# Patient Record
Sex: MALE | Race: WHITE | NOT HISPANIC OR LATINO | Employment: OTHER | URBAN - METROPOLITAN AREA
[De-identification: names, ages, dates, MRNs, and addresses within clinical notes are randomized per-mention and may not be internally consistent; named-entity substitution may affect disease eponyms.]

---

## 2017-01-01 ENCOUNTER — OUTPATIENT INFUSION SERVICES (OUTPATIENT)
Dept: ONCOLOGY | Facility: MEDICAL CENTER | Age: 62
End: 2017-01-01
Attending: INTERNAL MEDICINE
Payer: MEDICARE

## 2017-01-01 ENCOUNTER — HOSPITAL ENCOUNTER (OUTPATIENT)
Dept: LAB | Facility: MEDICAL CENTER | Age: 62
End: 2017-01-26
Attending: INTERNAL MEDICINE
Payer: MEDICARE

## 2017-01-01 ENCOUNTER — TELEPHONE (OUTPATIENT)
Dept: MEDICAL GROUP | Facility: PHYSICIAN GROUP | Age: 62
End: 2017-01-01

## 2017-01-01 ENCOUNTER — OFFICE VISIT (OUTPATIENT)
Dept: MEDICAL GROUP | Facility: PHYSICIAN GROUP | Age: 62
End: 2017-01-01
Payer: MEDICARE

## 2017-01-01 ENCOUNTER — APPOINTMENT (OUTPATIENT)
Dept: ONCOLOGY | Facility: MEDICAL CENTER | Age: 62
End: 2017-01-01
Attending: RADIOLOGY
Payer: MEDICARE

## 2017-01-01 VITALS
HEIGHT: 69 IN | WEIGHT: 135.8 LBS | SYSTOLIC BLOOD PRESSURE: 112 MMHG | DIASTOLIC BLOOD PRESSURE: 72 MMHG | OXYGEN SATURATION: 95 % | TEMPERATURE: 98.1 F | RESPIRATION RATE: 16 BRPM | BODY MASS INDEX: 20.11 KG/M2 | HEART RATE: 60 BPM

## 2017-01-01 VITALS
TEMPERATURE: 98.6 F | DIASTOLIC BLOOD PRESSURE: 78 MMHG | BODY MASS INDEX: 19.71 KG/M2 | HEIGHT: 68 IN | OXYGEN SATURATION: 95 % | SYSTOLIC BLOOD PRESSURE: 104 MMHG | RESPIRATION RATE: 18 BRPM | HEART RATE: 109 BPM | WEIGHT: 130.07 LBS

## 2017-01-01 VITALS
WEIGHT: 128.53 LBS | RESPIRATION RATE: 18 BRPM | SYSTOLIC BLOOD PRESSURE: 118 MMHG | HEIGHT: 68 IN | HEART RATE: 77 BPM | TEMPERATURE: 97.1 F | OXYGEN SATURATION: 96 % | BODY MASS INDEX: 19.48 KG/M2 | DIASTOLIC BLOOD PRESSURE: 83 MMHG

## 2017-01-01 VITALS
HEART RATE: 121 BPM | WEIGHT: 128.53 LBS | HEIGHT: 68 IN | SYSTOLIC BLOOD PRESSURE: 111 MMHG | OXYGEN SATURATION: 95 % | TEMPERATURE: 98.1 F | BODY MASS INDEX: 19.48 KG/M2 | RESPIRATION RATE: 18 BRPM | DIASTOLIC BLOOD PRESSURE: 85 MMHG

## 2017-01-01 VITALS
WEIGHT: 130.07 LBS | RESPIRATION RATE: 18 BRPM | TEMPERATURE: 98.5 F | HEIGHT: 69 IN | SYSTOLIC BLOOD PRESSURE: 103 MMHG | DIASTOLIC BLOOD PRESSURE: 82 MMHG | BODY MASS INDEX: 19.27 KG/M2 | HEART RATE: 106 BPM | OXYGEN SATURATION: 98 %

## 2017-01-01 DIAGNOSIS — B37.0 ORAL THRUSH: ICD-10-CM

## 2017-01-01 DIAGNOSIS — C34.92 METASTATIC LUNG CANCER (METASTASIS FROM LUNG TO OTHER SITE), LEFT (HCC): ICD-10-CM

## 2017-01-01 DIAGNOSIS — C34.90 NON-SMALL CELL LUNG CANCER (NSCLC) (HCC): ICD-10-CM

## 2017-01-01 DIAGNOSIS — T14.8XXA MULTIPLE SKIN TEARS: ICD-10-CM

## 2017-01-01 DIAGNOSIS — F43.21 ADJUSTMENT DISORDER WITH DEPRESSED MOOD: ICD-10-CM

## 2017-01-01 DIAGNOSIS — C79.49 SECONDARY MALIGNANT NEOPLASM OF BRAIN AND SPINAL CORD (HCC): ICD-10-CM

## 2017-01-01 DIAGNOSIS — C79.31 SECONDARY MALIGNANT NEOPLASM OF BRAIN AND SPINAL CORD (HCC): ICD-10-CM

## 2017-01-01 LAB
ALBUMIN SERPL BCP-MCNC: 3.5 G/DL (ref 3.2–4.9)
ALBUMIN SERPL BCP-MCNC: 3.6 G/DL (ref 3.2–4.9)
ALBUMIN SERPL BCP-MCNC: 3.8 G/DL (ref 3.2–4.9)
ALBUMIN/GLOB SERPL: 1.3 G/DL
ALBUMIN/GLOB SERPL: 1.5 G/DL
ALBUMIN/GLOB SERPL: 1.8 G/DL
ALP SERPL-CCNC: 76 U/L (ref 30–99)
ALP SERPL-CCNC: 77 U/L (ref 30–99)
ALP SERPL-CCNC: 99 U/L (ref 30–99)
ALT SERPL-CCNC: 25 U/L (ref 2–50)
ALT SERPL-CCNC: 25 U/L (ref 2–50)
ALT SERPL-CCNC: 39 U/L (ref 2–50)
ANION GAP SERPL CALC-SCNC: 10 MMOL/L (ref 0–11.9)
ANION GAP SERPL CALC-SCNC: 12 MMOL/L (ref 0–11.9)
ANION GAP SERPL CALC-SCNC: 9 MMOL/L (ref 0–11.9)
ANISOCYTOSIS BLD QL SMEAR: ABNORMAL
ANISOCYTOSIS BLD QL SMEAR: ABNORMAL
AST SERPL-CCNC: 19 U/L (ref 12–45)
AST SERPL-CCNC: 20 U/L (ref 12–45)
AST SERPL-CCNC: 23 U/L (ref 12–45)
BASOPHILS # BLD AUTO: 0 % (ref 0–1.8)
BASOPHILS # BLD AUTO: 0 K/UL (ref 0–0.12)
BASOPHILS # BLD AUTO: 0.3 % (ref 0–1.8)
BASOPHILS # BLD: 0 K/UL (ref 0–0.12)
BASOPHILS # BLD: 0.03 K/UL (ref 0–0.12)
BASOPHILS NFR BLD AUTO: 0 % (ref 0–1.8)
BILIRUB SERPL-MCNC: 0.4 MG/DL (ref 0.1–1.5)
BILIRUB SERPL-MCNC: 0.5 MG/DL (ref 0.1–1.5)
BILIRUB SERPL-MCNC: 0.7 MG/DL (ref 0.1–1.5)
BUN SERPL-MCNC: 18 MG/DL (ref 8–22)
BUN SERPL-MCNC: 20 MG/DL (ref 8–22)
BUN SERPL-MCNC: 35 MG/DL (ref 8–22)
CALCIUM SERPL-MCNC: 8.7 MG/DL (ref 8.5–10.5)
CALCIUM SERPL-MCNC: 8.7 MG/DL (ref 8.5–10.5)
CALCIUM SERPL-MCNC: 9 MG/DL (ref 8.5–10.5)
CHLORIDE SERPL-SCNC: 103 MMOL/L (ref 96–112)
CHLORIDE SERPL-SCNC: 103 MMOL/L (ref 96–112)
CHLORIDE SERPL-SCNC: 99 MMOL/L (ref 96–112)
CO2 SERPL-SCNC: 22 MMOL/L (ref 20–33)
CO2 SERPL-SCNC: 25 MMOL/L (ref 20–33)
CO2 SERPL-SCNC: 26 MMOL/L (ref 20–33)
CREAT SERPL-MCNC: 0.91 MG/DL (ref 0.5–1.4)
CREAT SERPL-MCNC: 0.94 MG/DL (ref 0.5–1.4)
CREAT SERPL-MCNC: 1.3 MG/DL (ref 0.5–1.4)
EOSINOPHIL # BLD AUTO: 0.01 K/UL (ref 0–0.51)
EOSINOPHIL # BLD AUTO: 0.04 K/UL (ref 0–0.51)
EOSINOPHIL # BLD: 0.02 K/UL (ref 0–0.51)
EOSINOPHIL NFR BLD AUTO: 0.9 % (ref 0–6.9)
EOSINOPHIL NFR BLD: 0.1 % (ref 0–6.9)
EOSINOPHIL NFR BLD: 0.9 % (ref 0–6.9)
ERYTHROCYTE [DISTWIDTH] IN BLOOD BY AUTOMATED COUNT: 55.5 FL (ref 35.9–50)
ERYTHROCYTE [DISTWIDTH] IN BLOOD BY AUTOMATED COUNT: 60.5 FL (ref 35.9–50)
ERYTHROCYTE [DISTWIDTH] IN BLOOD BY AUTOMATED COUNT: 63.6 FL (ref 35.9–50)
GFR SERPL CREATININE-BSD FRML MDRD: >60 ML/MIN/1.73 M 2
GFR SERPL CREATININE-BSD FRML MDRD: >60 ML/MIN/1.73 M 2
GLOBULIN SER CALC-MCNC: 1.9 G/DL (ref 1.9–3.5)
GLOBULIN SER CALC-MCNC: 2.4 G/DL (ref 1.9–3.5)
GLOBULIN SER CALC-MCNC: 2.9 G/DL (ref 1.9–3.5)
GLUCOSE SERPL-MCNC: 144 MG/DL (ref 65–99)
GLUCOSE SERPL-MCNC: 146 MG/DL (ref 65–99)
GLUCOSE SERPL-MCNC: 151 MG/DL (ref 65–99)
HCT VFR BLD AUTO: 34.4 % (ref 42–52)
HCT VFR BLD AUTO: 34.8 % (ref 42–52)
HCT VFR BLD AUTO: 35.6 % (ref 42–52)
HGB BLD-MCNC: 11.3 G/DL (ref 14–18)
HGB BLD-MCNC: 11.5 G/DL (ref 14–18)
HGB BLD-MCNC: 11.7 G/DL (ref 14–18)
LDH SERPL L TO P-CCNC: 336 U/L (ref 107–266)
LDH SERPL-CCNC: 232 U/L (ref 107–266)
LYMPHOCYTES # BLD AUTO: 0.74 K/UL (ref 1–4.8)
LYMPHOCYTES # BLD AUTO: 1.07 K/UL (ref 1–4.8)
LYMPHOCYTES # BLD: 0.75 K/UL (ref 1–4.8)
LYMPHOCYTES NFR BLD AUTO: 31.2 % (ref 22–41)
LYMPHOCYTES NFR BLD: 12 % (ref 22–41)
LYMPHOCYTES NFR BLD: 17.1 % (ref 22–41)
MAGNESIUM SERPL-MCNC: 1.5 MG/DL (ref 1.5–2.5)
MAGNESIUM SERPL-MCNC: 1.8 MG/DL (ref 1.5–2.5)
MAGNESIUM SERPL-MCNC: 1.8 MG/DL (ref 1.5–2.5)
MANUAL DIFF BLD: NORMAL
MANUAL DIFF BLD: NORMAL
MCH RBC QN AUTO: 31.3 PG (ref 27–33)
MCH RBC QN AUTO: 31.7 PG (ref 27–33)
MCH RBC QN AUTO: 31.7 PG (ref 27–33)
MCHC RBC AUTO-ENTMCNC: 32.8 G/DL (ref 33.7–35.3)
MCHC RBC AUTO-ENTMCNC: 32.9 G/DL (ref 33.7–35.3)
MCHC RBC AUTO-ENTMCNC: 33 G/DL (ref 33.7–35.3)
MCV RBC AUTO: 94.8 FL (ref 81.4–97.8)
MCV RBC AUTO: 96.5 FL (ref 81.4–97.8)
MCV RBC AUTO: 96.6 FL (ref 81.4–97.8)
MICROCYTES BLD QL SMEAR: ABNORMAL
MONOCYTES # BLD AUTO: 0.39 K/UL (ref 0–0.85)
MONOCYTES # BLD AUTO: 0.54 K/UL (ref 0–0.85)
MONOCYTES # BLD: 0.24 K/UL (ref 0–0.85)
MONOCYTES NFR BLD AUTO: 6.1 % (ref 0–13.4)
MONOCYTES NFR BLD AUTO: 9 % (ref 0–13.4)
MONOCYTES NFR BLD AUTO: 9.8 % (ref 0–13.4)
MORPHOLOGY BLD-IMP: NORMAL
MORPHOLOGY BLD-IMP: NORMAL
NEUTROPHILS # BLD AUTO: 3.14 K/UL (ref 1.82–7.42)
NEUTROPHILS # BLD AUTO: 7.27 K/UL (ref 1.82–7.42)
NEUTROPHILS # BLD: 1.33 K/UL (ref 1.82–7.42)
NEUTROPHILS NFR BLD AUTO: 55.4 % (ref 44–72)
NEUTROPHILS NFR BLD: 70.3 % (ref 44–72)
NEUTROPHILS NFR BLD: 81.5 % (ref 44–72)
NEUTS BAND NFR BLD MANUAL: 2.7 % (ref 0–10)
NEUTS HYPERSEG BLD QL SMEAR: NORMAL
NRBC # BLD AUTO: 0 K/UL
NRBC # BLD AUTO: 0.04 K/UL
NRBC BLD AUTO-RTO: 0 /100 WBC
NRBC BLD-RTO: 1.7 /100 WBC
OVALOCYTES BLD QL SMEAR: NORMAL
PLATELET # BLD AUTO: 125 K/UL (ref 164–446)
PLATELET # BLD AUTO: 71 K/UL (ref 164–446)
PLATELET # BLD AUTO: 75 K/UL (ref 164–446)
PLATELET BLD QL SMEAR: NORMAL
PLATELET BLD QL SMEAR: NORMAL
PMV BLD AUTO: 10.6 FL (ref 9–12.9)
PMV BLD AUTO: 12.1 FL (ref 9–12.9)
PMV BLD AUTO: 9 FL (ref 9–12.9)
POIKILOCYTOSIS BLD QL SMEAR: NORMAL
POTASSIUM SERPL-SCNC: 3.7 MMOL/L (ref 3.6–5.5)
POTASSIUM SERPL-SCNC: 3.7 MMOL/L (ref 3.6–5.5)
POTASSIUM SERPL-SCNC: 4.8 MMOL/L (ref 3.6–5.5)
PROMYELOCYTES NFR BLD MANUAL: 2.7 %
PROT SERPL-MCNC: 5.4 G/DL (ref 6–8.2)
PROT SERPL-MCNC: 6 G/DL (ref 6–8.2)
PROT SERPL-MCNC: 6.7 G/DL (ref 6–8.2)
RBC # BLD AUTO: 3.56 M/UL (ref 4.7–6.1)
RBC # BLD AUTO: 3.67 M/UL (ref 4.7–6.1)
RBC # BLD AUTO: 3.69 M/UL (ref 4.7–6.1)
RBC BLD AUTO: PRESENT
RBC BLD AUTO: PRESENT
SODIUM SERPL-SCNC: 133 MMOL/L (ref 135–145)
SODIUM SERPL-SCNC: 137 MMOL/L (ref 135–145)
SODIUM SERPL-SCNC: 139 MMOL/L (ref 135–145)
TOXIC GRANULATION 1637: NORMAL
URATE SERPL-MCNC: 3.2 MG/DL (ref 2.5–8.3)
URATE SERPL-MCNC: 4.9 MG/DL (ref 2.5–8.3)
WBC # BLD AUTO: 2.4 K/UL (ref 4.8–10.8)
WBC # BLD AUTO: 4.3 K/UL (ref 4.8–10.8)
WBC # BLD AUTO: 8.9 K/UL (ref 4.8–10.8)

## 2017-01-01 PROCEDURE — 36591 DRAW BLOOD OFF VENOUS DEVICE: CPT

## 2017-01-01 PROCEDURE — 85027 COMPLETE CBC AUTOMATED: CPT

## 2017-01-01 PROCEDURE — 700105 HCHG RX REV CODE 258

## 2017-01-01 PROCEDURE — 700111 HCHG RX REV CODE 636 W/ 250 OVERRIDE (IP)

## 2017-01-01 PROCEDURE — 36415 COLL VENOUS BLD VENIPUNCTURE: CPT

## 2017-01-01 PROCEDURE — 80053 COMPREHEN METABOLIC PANEL: CPT

## 2017-01-01 PROCEDURE — 96372 THER/PROPH/DIAG INJ SC/IM: CPT

## 2017-01-01 PROCEDURE — A4212 NON CORING NEEDLE OR STYLET: HCPCS

## 2017-01-01 PROCEDURE — 83615 LACTATE (LD) (LDH) ENZYME: CPT

## 2017-01-01 PROCEDURE — 96367 TX/PROPH/DG ADDL SEQ IV INF: CPT

## 2017-01-01 PROCEDURE — 700111 HCHG RX REV CODE 636 W/ 250 OVERRIDE (IP): Performed by: INTERNAL MEDICINE

## 2017-01-01 PROCEDURE — 99214 OFFICE O/P EST MOD 30 MIN: CPT | Performed by: FAMILY MEDICINE

## 2017-01-01 PROCEDURE — 83735 ASSAY OF MAGNESIUM: CPT

## 2017-01-01 PROCEDURE — 96375 TX/PRO/DX INJ NEW DRUG ADDON: CPT

## 2017-01-01 PROCEDURE — 700101 HCHG RX REV CODE 250

## 2017-01-01 PROCEDURE — 96413 CHEMO IV INFUSION 1 HR: CPT

## 2017-01-01 PROCEDURE — 84550 ASSAY OF BLOOD/URIC ACID: CPT

## 2017-01-01 PROCEDURE — 85007 BL SMEAR W/DIFF WBC COUNT: CPT

## 2017-01-01 PROCEDURE — 700105 HCHG RX REV CODE 258: Performed by: INTERNAL MEDICINE

## 2017-01-01 PROCEDURE — 96368 THER/DIAG CONCURRENT INF: CPT

## 2017-01-01 PROCEDURE — 96411 CHEMO IV PUSH ADDL DRUG: CPT

## 2017-01-01 PROCEDURE — 85025 COMPLETE CBC W/AUTO DIFF WBC: CPT

## 2017-01-01 PROCEDURE — 96361 HYDRATE IV INFUSION ADD-ON: CPT

## 2017-01-01 PROCEDURE — 96417 CHEMO IV INFUS EACH ADDL SEQ: CPT

## 2017-01-01 RX ORDER — MIRTAZAPINE 30 MG/1
30 TABLET, FILM COATED ORAL
Qty: 90 TAB | Refills: 3 | Status: SHIPPED | OUTPATIENT
Start: 2017-01-01 | End: 2017-01-01

## 2017-01-01 RX ORDER — MIRTAZAPINE 15 MG/1
15 TABLET, FILM COATED ORAL NIGHTLY
COMMUNITY
End: 2017-01-01 | Stop reason: CLARIF

## 2017-01-01 RX ORDER — DULOXETIN HYDROCHLORIDE 20 MG/1
20 CAPSULE, DELAYED RELEASE ORAL DAILY
Qty: 30 CAP | Refills: 3 | Status: SHIPPED | OUTPATIENT
Start: 2017-01-01

## 2017-01-01 RX ORDER — SODIUM CHLORIDE 9 MG/ML
500 INJECTION, SOLUTION INTRAVENOUS ONCE
Status: COMPLETED | OUTPATIENT
Start: 2017-01-01 | End: 2017-01-01

## 2017-01-01 RX ORDER — LIDOCAINE HYDROCHLORIDE 10 MG/ML
INJECTION, SOLUTION INFILTRATION; PERINEURAL
Status: COMPLETED
Start: 2017-01-01 | End: 2017-01-01

## 2017-01-01 RX ADMIN — CISPLATIN 126 MG: 1 INJECTION, SOLUTION INTRAVENOUS at 10:44

## 2017-01-01 RX ADMIN — DEXAMETHASONE SODIUM PHOSPHATE 12 MG: 4 INJECTION, SOLUTION INTRAMUSCULAR; INTRAVENOUS at 09:23

## 2017-01-01 RX ADMIN — HEPARIN 500 UNITS: 100 SYRINGE at 10:25

## 2017-01-01 RX ADMIN — SODIUM CHLORIDE 840 MG: 9 INJECTION, SOLUTION INTRAVENOUS at 10:24

## 2017-01-01 RX ADMIN — SODIUM CHLORIDE 150 MG: 9 INJECTION, SOLUTION INTRAVENOUS at 09:35

## 2017-01-01 RX ADMIN — LIDOCAINE HYDROCHLORIDE: 10 INJECTION, SOLUTION INFILTRATION; PERINEURAL at 10:21

## 2017-01-01 RX ADMIN — SODIUM CHLORIDE 500 ML: 9 INJECTION, SOLUTION INTRAVENOUS at 08:15

## 2017-01-01 RX ADMIN — HEPARIN 500 UNITS: 100 SYRINGE at 13:03

## 2017-01-01 RX ADMIN — ONDANSETRON HYDROCHLORIDE 16 MG: 2 SOLUTION INTRAMUSCULAR; INTRAVENOUS at 09:04

## 2017-01-01 RX ADMIN — POTASSIUM CHLORIDE: 2 INJECTION, SOLUTION, CONCENTRATE INTRAVENOUS at 10:58

## 2017-01-01 RX ADMIN — PEGFILGRASTIM 6 MG: 6 INJECTION SUBCUTANEOUS at 14:37

## 2017-01-01 RX ADMIN — FILGRASTIM 480 MCG: 480 INJECTION, SOLUTION INTRAVENOUS; SUBCUTANEOUS at 10:08

## 2017-01-01 ASSESSMENT — PATIENT HEALTH QUESTIONNAIRE - PHQ9
5. POOR APPETITE OR OVEREATING: 3 - NEARLY EVERY DAY
CLINICAL INTERPRETATION OF PHQ2 SCORE: 6
SUM OF ALL RESPONSES TO PHQ QUESTIONS 1-9: 22

## 2017-01-06 NOTE — PROGRESS NOTES
Pt arrived to IS, ambulatory, for Neupogen injection. Pt and significant other educated about neupogen and potential side effects, handout provided. Labs reviewed from 1/5 from MD office. Neupogen injected into the RLQ abdomen with no s/sx of adverse reaction. Pt left IS with no s/sx of distress. Follow up appointment confirmed.

## 2017-01-07 NOTE — PROGRESS NOTES
Pt arrived to IS, ambulatory, for labs/possible neupogen. Pt states he feels much better than he did yesterday. Port accessed in sterile manner, positive blood return noted. Pt hand carries additional labs to be drawn for Dr. Carrillo. Labs drawn per active order. Port flushed and heparin locked per policy, port de-accessed. Labs reviewed, ANC noted to be 3140 today, no need for neupogen at this time. Pt left IS with no s/sx of distress. Follow up appointment confirmed.

## 2017-01-14 NOTE — TELEPHONE ENCOUNTER
1. Caller Name: [facility]                                         Call Back Number: [phone]      Patient approves a detailed voicemail message: N\A    Pt is showing signs of depression needing order for depression medication may call [phone] or [phone]

## 2017-01-14 NOTE — TELEPHONE ENCOUNTER
Spoke with MADDY Diaz with [facility]. Patient has terminal lung cancer and is feeling depressed. Prescription sent in for mirtazapine.  Tahmina Montemayor M.D.

## 2017-01-16 NOTE — PROGRESS NOTES
Pt arrived to infusion for D1C4 Alimta/cisplat. Port accessed in sterile fashion, brisk blood return. Labs drawn. Pt premedicated and hydration administered per order. Chemo infused per order and completed at 1145. Post hydration complete. Port flushed, hep locked and de accessed. Pt dc'd home with wife in no acute distress. Future appointment confirmed.

## 2017-01-16 NOTE — PROGRESS NOTES
"Pharmacy Chemotherapy Verification  Patient Name: Stu Sanchez   Dx: NSCLC          Protocol: CISplatin/Pemetrexed (Alimta)     Pemetrexed 500mg/m2 IV over 10 min on Day 1  CISplatin 75mg/m2 IV over 60 min on Day 1  Every 21 days x 4-6 cycles   *Dosing Reference*  NCCN Guidelines for Non-Small Cell Lung Cancer. V.3.2017  Hamzah HANNAH et al. J Clin Oncol. 2008;26(21):9058-80    Allergies:  Review of patient's allergies indicates no known allergies.     /85 mmHg  Pulse 121  Temp(Src) 36.7 °C (98.1 °F)  Resp 18  Ht 1.735 m (5' 8.31\")  Wt 58.3 kg (128 lb 8.5 oz)  BMI 19.37 kg/m2  SpO2 95% Body surface area is 1.68 meters squared.     Labs 1/16/17  ANC~ 7270 Plt = 125k   Hgb = 11.7   SCr = 0.91 mg/dL CrCl ~ 72 mL/min    AST/ALT/AP/Tbili = WNL   Mag = 1.8  K+ = 3.7    Cyanocobalamin 1000 mcg subq last given 12/26/16  Folic Acid 1mg po qday Patient is taking per RN     Drug Order   (Drug name, dose, route, IV Fluid & volume, frequency, number of doses) Cycle: 4    Previous treatment: 12/26/16     Medication = Pemetrexed  Base Dose= 500 mg/m2  Calc Dose: Base Dose x 1.68 m2 = 840 mg  Final Dose = 840 mg  Route = IV  Fluid & Volume =  mL  Admin Duration = Over 10 min     <5% difference, OK to treat with final  dose     Medication = CISplatin  Base Dose= 75 mg/m2  Calc Dose: Base Dose x 1.68 m2 = 126 mg  Final Dose = 126 mg  Route = IV  Fluid & Volume =  mL  Admin Duration = Over 60 min           <5% difference, OK to treat with final dose       By my signature below, I confirm this process was performed independently with the BSA and all final chemotherapy dosing calculations congruent. I have reviewed the above chemotherapy order and that my calculation of the final dose and BSA (when applicable) corroborate those calculations of the  pharmacist. Discrepancies of 5% or greater in the written dose have been addressed and documented within the [facility] Progress notes.    Hao " Shannon LopezD

## 2017-01-16 NOTE — PROGRESS NOTES
"Pharmacy Chemotherapy Calculations    Dx: metastatic NSC lung cancer  Cycle: 4  Previous treatment = 12/26/16    Regimen: Cisplatin + Alimta  Pemetrexed (Alimta) 500 mg/m2 IV over 10 minutes on day 1  Cisplatin 75 mg/m2 IV over 60 minutes on day 1  21 day cycle for 4-6 cycles  NCCN Guideline for NSCLC V.3.2017.  Hamzah HANNAH et al - J Clin Oncol. 2008 Jul 20;26(21):3547-51.  Mona TERRAZAS et al - Neela Oncol. 2013 Apr;24(4):986-92.      /85 mmHg  Pulse 121  Temp(Src) 36.7 °C (98.1 °F)  Resp 18  Ht 1.735 m (5' 8.31\")  Wt 58.3 kg (128 lb 8.5 oz)  BMI 19.37 kg/m2  SpO2 95% Body surface area is 1.68 meters squared.    ANC~ 7270 Plt = 125k   Hgb = 11.7   SCr = 0.91mg/dL CrCl ~ 72mL/min   LFT's = WNl TBili = 0.4   Mag = 1.8        Pemetrexed (Alimta) 500 mg/m² x 1.68m² = 840mg   <5% difference, OK to treat with final dose = 840mg IV    Cisplatin 75 mg/m² x 1.68m² = 126mg   <5% difference, OK to treat with final dose = 126mg IV    Cyanocobalamin given 12/26/16    SAVANNAH Sullivan PharmLaD.        "

## 2017-01-16 NOTE — PROGRESS NOTES
Chemotherapy Verification - PRIMARY RN      Height = 173.5cm  Weight = 58.3kg  BSA = 1.68m^2       Medication: Alimta  Dose: 500mg/m^2  Calculated Dose: 840mg                             (In mg/m2, AUC, mg/kg)     Medication: Cisplatin  Dose: 75mg/m^2  Calculated Dose: 126mg                             (In mg/m2, AUC, mg/kg)        I confirm this process was performed independently with the BSA and all final chemotherapy dosing calculations congruent.  Any discrepancies of 5% or greater have been addressed with the chemotherapy pharmacist. The resolution of the discrepancy has been documented in the EPIC progress notes.

## 2017-01-16 NOTE — PROGRESS NOTES
Chemotherapy Verification - SECONDARY RN       Height = 68.31 inches  Weight = 58.3 kg  BSA = 1.673 m2        Medication: Alimta  Dose: 500 mg/m2  Calculated Dose: 836.5 mg                             (In mg/m2, AUC, mg/kg)     Medication: Cisplatin  Dose: 75 mg/m2  Calculated Dose: 125.5 mg                             (In mg/m2, AUC, mg/kg)      I confirm that this process was performed independently.

## 2017-01-17 PROBLEM — F43.21 ADJUSTMENT DISORDER WITH DEPRESSED MOOD: Status: ACTIVE | Noted: 2017-01-01

## 2017-01-17 PROBLEM — T14.8XXA MULTIPLE SKIN TEARS: Status: ACTIVE | Noted: 2017-01-01

## 2017-01-17 NOTE — PROGRESS NOTES
"Subjective:   Stu Sanchez is a 61 y.o. male here today for follow-up lung cancer, questions about depression medication and travel. He is accompanied by his partner, Amanda.    \"Marcell\" had his 4th chemotherapy cycle yesterday and reports he is feeling \"okay\". He has not been hospitalized since his last office visit. Amanda tells me that they have an appointment with Dr. Daly next week to discuss if he will have more chemotherapy, radiation or imaging. He was set up with palliative care through [facility] last month, but a representative has only been out to the house once for an intake exam.     He has been taking mirtazipine 30mg for the last two nights. He tells me that he feels \"better\" and has noticed it helps him sleep at night. Denies fever, chills, cough, difficulty swallowing or nausea.     Marcell asks if it would be safe for him to travel to [place]. His father has some belongings there that his mother would like to bring back. His father is also terminally ill with lung cancer.    Current medicines (including changes today)  Current Outpatient Prescriptions   Medication Sig Dispense Refill   • mirtazapine (REMERON) 30 MG Tab tablet Take 1 Tab by mouth every bedtime. 90 Tab 3   • OMEPRAZOLE PO Take  by mouth.     • Wheat Dextrin (BENEFIBER DRINK MIX PO) Take  by mouth.     • levetiracetam (KEPPRA) 500 MG Tab Take 1 Tab by mouth 2 Times a Day. 60 Tab 2   • lactobacillus granules (LACTINEX/FLORANEX) Pack Take 1 Packet by mouth 3 times a day, with meals.     • tamsulosin (FLOMAX) 0.4 MG capsule      • ondansetron (ZOFRAN ODT) 4 MG TABLET DISPERSIBLE Take 4 mg by mouth every 8 hours as needed for Nausea/Vomiting.     • lidocaine-prilocaine (EMLA) 2.5-2.5 % Cream Apply  to affected area(s) as needed.     • Polyethylene Glycol 3350 (MIRALAX PO) Take  by mouth.     • oxycodone immediate-release (ROXICODONE) 5 MG Tab Take 2 Tabs by mouth every four hours as needed for Severe Pain. 1-2 tabs PO Q 4-6 hrs " "prn 80 Tab 0   • dexamethasone (DECADRON) 1 MG Tab Take 1 Tab by mouth every 6 hours. Pt to take 4 mg (4 tabs) every 6 hrs for 24 hrs then 4mg tabs (4 tabs) Q 8 hrs for 24 hrs, then 2 mg (2 tabs) every 8 hrs for 48 hrs then 1 mg (1 tab) every 8 hrs for 48 hrs then 1 mg (1 tab) every 12 hrs and continue with this dose until instructed by Oncology (Patient taking differently: Take 2 mg by mouth 2 Times a Day. Pt to take 4 mg (4 tabs) every 6 hrs for 24 hrs then 4mg tabs (4 tabs) Q 8 hrs for 24 hrs, then 2 mg (2 tabs) every 8 hrs for 48 hrs then 1 mg (1 tab) every 8 hrs for 48 hrs then 1 mg (1 tab) every 12 hrs and continue with this dose until instructed by Oncology) 70 Tab 0   • baclofen (LIORESAL) 10 MG Tab Take 10 mg by mouth 3 times a day.     • trazodone (DESYREL) 150 MG TABS Take 150 mg by mouth every evening.     • prochlorperazine (COMPAZINE) 5 MG Tab Take 5 mg by mouth every 6 hours as needed for Nausea/Vomiting.     • ranitidine (ZANTAC) 150 MG Tab Take 150 mg by mouth 2 times a day.       No current facility-administered medications for this visit.     He  has a past medical history of Migraines; Neck pain; Infectious disease; and Cancer (CMS-HCC).    ROS  See above. No fever, no chest pain, no shortness of breath, no abdominal pain.     Objective:     Physical Exam:  Blood pressure 112/72, pulse 60, temperature 36.7 °C (98.1 °F), resp. rate 16, height 1.753 m (5' 9.02\"), weight 61.6 kg (135 lb 12.9 oz), SpO2 95 %. Body mass index is 20.05 kg/(m^2).   Constitutional: Ill-appearing male in no acute distress.  Skin: Multiple abrasions on bilateral forearms. +Hair loss.  ENMT: Lips without lesions, good dentition, oropharynx clear. No surrounding erythema or drainage. No visible tongue lesion.  Neck: Trachea midline, no masses, no thyromegaly. No cervical or supraclavicular lymphadenopathy.  Respiratory: +Chemoport on right side. Unlabored respiratory effort, good air entry bilaterally, " +rhonci.  Cardiovascular: Normal S1, S2, no murmur, no edema.  Abdomen: Soft, non-tender, no masses, no hepatosplenomegaly.  Psych: Flat affect.    Assessment and Plan:     1. Non-small cell lung cancer (NSCLC) (HCC)  2. 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 Patient is followed closely by Dr. Carrillo and Dr. Ahumada. May possibly have more chemotherapy and radiation in the near future. Established with local palliative care organization. Appreciate oncology recommendations.  - Patient has been identified as being depressed and appropriate orders and counseling have been given    3. Adjustment disorder with depressed mood  Tolerating mirtazapine. As patient is taking trazodone, there is a contraindication with SSRI's (fatal arrhythmias) and this class was avoided. Continue to monitor.    4. Multiple skin tears  Noted on exam. Wound care provided. Instructions and supplies given to patient's partner to take home. Call placed to MADDY Diaz at [facility] to follow-up.    Total of 30 minutes face-to-face time spent with patient, with greater than 50% of the total time discussing patient's issues and symptoms as listed above in assessment and plan, as well as managing coordination of care for future evaluation and treatment.    Followup: Planning home visit and communication via phone as patient lives in [place], [place].          PLEASE NOTE: This dictation was created using voice recognition software. I have made every reasonable attempt to correct obvious errors, but I expect that there are errors of grammar and possibly content that I did not discover before finalizing the note.

## 2017-01-17 NOTE — MR AVS SNAPSHOT
"        Stu Sladeellie   2017 10:40 AM   Office Visit   MRN: [id number]    Department:  [facility]   Dept Phone:  [phone]    Description:  Male : 1955   Provider:  Tahmina Montemayor M.D.           Reason for Visit     Follow-Up           Allergies as of 2017     No Known Allergies      You were diagnosed with     Adjustment disorder with depressed mood   [309.0.ICD-9-CM]       Non-small cell lung cancer (NSCLC) (CMS-HCC)   [8348398]       Secondary malignant neoplasm of brain and spinal cord (CMS-HCC)   [198.3.ICD-9-CM]       Multiple skin tears   [519152]         Vital Signs     Blood Pressure Pulse Temperature Respirations Height Weight    112/72 mmHg 60 36.7 °C (98.1 °F) 16 1.753 m (5' 9.02\") 61.6 kg (135 lb 12.9 oz)    Body Mass Index Oxygen Saturation Smoking Status             20.05 kg/m2 95% Current Every Day Smoker         Basic Information     Date Of Birth Sex Race Ethnicity Preferred Language    1955 Male White Non- English      Your appointments     2017  2:00 PM   Est Injection with RN 8   [facility] ([facility])    [address] [place] [id number]   [phone]            2017  8:30 AM   Est Chemo 5 with RN 6   [facility] ([facility])    [address] [place] [id number]   [phone]            2017  2:00 PM   Est Injection with RN 5   [facility] ([facility])    [address] [place] [id number]   [phone]              Problem List              ICD-10-CM Priority Class Noted - Resolved    Left homonymous hemianopsia H53.462 High  2016 - Present    Emphysema lung (HCC) J43.9 Medium  2016 - Present    Tobacco abuse Z72.0 Medium  2016 - Present    Dyslipidemia (Chronic) E78.5 Low  2016 - Present    Steroid-induced hyperglycemia R73.9 Medium  2016 - Present    Cancer determined by lung biopsy (CMS-HCC) C34.90 High  2016 - Present    Non-small cell lung cancer " (NSCLC) (CMS-HCC) C34.90 High  8/25/2016 - Present    Subclinical hyperthyroidism E05.90 Medium  8/25/2016 - Present    Secondary malignant neoplasm of brain and spinal cord (CMS-HCC) C79.31, C79.49   8/30/2016 - Present    Mass of tongue R22.0   10/24/2016 - Present    Metastatic lung cancer (metastasis from lung to other site) (CMS-HCC) C34.90   11/5/2016 - Present    Dysphagia R13.10   11/5/2016 - Present    Bradycardia R00.1   11/7/2016 - Present    Adjustment disorder with depressed mood F43.21   1/17/2017 - Present    Multiple skin tears T14.8   1/17/2017 - Present      Health Maintenance        Date Due Completion Dates    IMM DTaP/Tdap/Td Vaccine (1 - Tdap) 8/15/1976 ---    IMM PNEUMOCOCCAL 19-64 (ADULT) MEDIUM RISK SERIES (1 of 1 - PPSV23) 10/26/2016 8/31/2016            Current Immunizations     13-VALENT PCV PREVNAR 8/31/2016  5:50 PM    Influenza Vaccine Quad Inj (Preserved) 10/24/2016      Below and/or attached are the medications your provider expects you to take. Review all of your home medications and newly ordered medications with your provider and/or pharmacist. Follow medication instructions as directed by your provider and/or pharmacist. Please keep your medication list with you and share with your provider. Update the information when medications are discontinued, doses are changed, or new medications (including over-the-counter products) are added; and carry medication information at all times in the event of emergency situations     Allergies:  No Known Allergies          Medications  Valid as of: January 17, 2017 - 11:07 AM    Generic Name Brand Name Tablet Size Instructions for use    Baclofen (Tab) LIORESAL 10 MG Take 10 mg by mouth 3 times a day.        Dexamethasone (Tab) DECADRON 1 MG Take 1 Tab by mouth every 6 hours. Pt to take 4 mg (4 tabs) every 6 hrs for 24 hrs then 4mg tabs (4 tabs) Q 8 hrs for 24 hrs, then 2 mg (2 tabs) every 8 hrs for 48 hrs then 1 mg (1 tab) every 8 hrs for 48  hrs then 1 mg (1 tab) every 12 hrs and continue with this dose until instructed by Oncology        Lactobacillus (Pack) LACTINEX/FLORANEX  Take 1 Packet by mouth 3 times a day, with meals.        LevETIRAcetam (Tab) KEPPRA 500 MG Take 1 Tab by mouth 2 Times a Day.        Lidocaine-Prilocaine (Cream) EMLA 2.5-2.5 % Apply  to affected area(s) as needed.        Mirtazapine (Tab) REMERON 30 MG Take 1 Tab by mouth every bedtime.        Omeprazole   Take  by mouth.        Ondansetron (TABLET DISPERSIBLE) ZOFRAN ODT 4 MG Take 4 mg by mouth every 8 hours as needed for Nausea/Vomiting.        OxyCODONE HCl (Tab) ROXICODONE 5 MG Take 2 Tabs by mouth every four hours as needed for Severe Pain. 1-2 tabs PO Q 4-6 hrs prn        Polyethylene Glycol 3350   Take  by mouth.        Prochlorperazine Maleate (Tab) COMPAZINE 5 MG Take 5 mg by mouth every 6 hours as needed for Nausea/Vomiting.        RaNITidine HCl (Tab) ZANTAC 150 MG Take 150 mg by mouth 2 times a day.        Tamsulosin HCl (Cap) FLOMAX 0.4 MG         TraZODone HCl (Tab) DESYREL 150 MG Take 150 mg by mouth every evening.        Wheat Dextrin   Take  by mouth.        .                 Medicines prescribed today were sent to:     [id number] [address]    Phone: [phone] Fax: [phone]    Open 24 Hours?: No      Medication refill instructions:       If your prescription bottle indicates you have medication refills left, it is not necessary to call your provider’s office. Please contact your pharmacy and they will refill your medication.    If your prescription bottle indicates you do not have any refills left, you may request refills at any time through one of the following ways: The online [employer] system (except Urgent Care), by calling your provider’s office, or by asking your pharmacy to contact your provider’s office with a refill request. Medication refills are processed  only during regular business hours and may not be available until the next business day. Your provider may request additional information or to have a follow-up visit with you prior to refilling your medication.   *Please Note: Medication refills are assigned a new Rx number when refilled electronically. Your pharmacy may indicate that no refills were authorized even though a new prescription for the same medication is available at the pharmacy. Please request the medicine by name with the pharmacy before contacting your provider for a refill.           MyChart Status: Patient Declined

## 2017-01-18 NOTE — PROGRESS NOTES
Returns for Neulasta.  First dose today, previously had neupogen.  Reviewed potential side effects and purpose.  Tx given without rx, monitored for 20 minutes post.  DC to care of s.o.

## 2017-01-20 NOTE — TELEPHONE ENCOUNTER
This patient has terminal lung cancer and I'm going to try to see him at home as they live in [place]. Can you call patient or significant other (Amanda) and let them know Dr. Montemayor can do home visits. Please see if they want to schedule one on a Thursday afternoon in the next 2 weeks? If not, they can call if they are having any problems and we can schedule them as needed.  Tahmina Montemayor M.D.

## 2017-01-23 NOTE — TELEPHONE ENCOUNTER
Spoke with Emily who reports that patient has signs of dehydration but is refusing to go to the hospital. Advised her that nystatin swish and swallow sent in as well as a new anti-depressant.  Tahmina Montemayor M.D.

## 2017-01-23 NOTE — TELEPHONE ENCOUNTER
Spoke with Amanda, patient's significant other. She confirms message below and is concerned that Darren may need to go to the ER. It also appears he has a recurrence of thrush. I have sent in some medication for Darren to swish and swallow to treat the thrush. Strict ER precautions given. I also spoke with Emily ([phone]) and requested she make a home visit. She told me she would go to the house.  Tahmina Montemayor M.D.

## 2017-01-23 NOTE — TELEPHONE ENCOUNTER
I spoke with patient's wife Jackelyn and she states Darren is feeling really fatigue and possibly has thrush on his tongue. He has a rapid pulse and she feels he could possibly be dehydrated as these are the same symptoms as the last time he was hospitalized for dehydration.  She states patient does not want to get up to go to ER.  Message to  to further advise.

## 2017-02-03 NOTE — TELEPHONE ENCOUNTER
"LATE ENTRY: I visited patient at his home in [place] for a home visit. His partner, Amanda, informed me that he is now on hospice services from [facility]. On Tuesday, he went to [facility] to see a \"pre-graduation\" ceremony for his youngest daughter, Abi. Following the ceremony, patient has been feeling weaker and weaker. He made the decision to go on hospice. While I was at patient's home, a  (Alyssa) arrived. She recommended Amanda call [employer] to discuss patient's life insurance and bills. I provided Amanda with my number in case she has any questions.  Tahmina Montemayor M.D.    "